# Patient Record
Sex: MALE | Race: WHITE | Employment: FULL TIME | URBAN - METROPOLITAN AREA
[De-identification: names, ages, dates, MRNs, and addresses within clinical notes are randomized per-mention and may not be internally consistent; named-entity substitution may affect disease eponyms.]

---

## 2018-03-26 ENCOUNTER — HOSPITAL ENCOUNTER (OUTPATIENT)
Dept: MAMMOGRAPHY | Age: 53
Discharge: HOME | End: 2018-03-26
Attending: FAMILY MEDICINE
Payer: COMMERCIAL

## 2018-03-26 DIAGNOSIS — N60.02 BREAST CYST, LEFT: ICD-10-CM

## 2018-03-26 PROCEDURE — 76642 ULTRASOUND BREAST LIMITED: CPT

## 2018-03-26 PROCEDURE — 77066 DX MAMMO INCL CAD BI: CPT

## 2020-12-08 ENCOUNTER — HOSPITAL ENCOUNTER (OUTPATIENT)
Dept: CT IMAGING | Age: 55
Discharge: HOME | End: 2020-12-08
Attending: FAMILY MEDICINE

## 2020-12-08 DIAGNOSIS — Z13.220 SCREENING FOR LIPOID DISORDERS: ICD-10-CM

## 2022-07-27 ENCOUNTER — OFFICE VISIT (OUTPATIENT)
Dept: FAMILY MEDICINE CLINIC | Facility: CLINIC | Age: 57
End: 2022-07-27
Payer: COMMERCIAL

## 2022-07-27 VITALS
HEART RATE: 98 BPM | SYSTOLIC BLOOD PRESSURE: 120 MMHG | BODY MASS INDEX: 31.96 KG/M2 | OXYGEN SATURATION: 94 % | DIASTOLIC BLOOD PRESSURE: 76 MMHG | WEIGHT: 210.2 LBS | RESPIRATION RATE: 18 BRPM

## 2022-07-27 DIAGNOSIS — E03.9 HYPOTHYROIDISM, UNSPECIFIED TYPE: ICD-10-CM

## 2022-07-27 DIAGNOSIS — M10.9 GOUT, UNSPECIFIED CAUSE, UNSPECIFIED CHRONICITY, UNSPECIFIED SITE: ICD-10-CM

## 2022-07-27 DIAGNOSIS — E78.5 ELEVATED LIPIDS: ICD-10-CM

## 2022-07-27 DIAGNOSIS — Z00.00 ENCOUNTER FOR GENERAL ADULT MEDICAL EXAMINATION WITHOUT ABNORMAL FINDINGS: ICD-10-CM

## 2022-07-27 DIAGNOSIS — K21.00 GASTRO-ESOPHAGEAL REFLUX DISEASE WITH ESOPHAGITIS, WITHOUT BLEEDING: ICD-10-CM

## 2022-07-27 DIAGNOSIS — F51.01 PRIMARY INSOMNIA: Primary | ICD-10-CM

## 2022-07-27 PROCEDURE — 99396 PREV VISIT EST AGE 40-64: CPT | Performed by: FAMILY MEDICINE

## 2022-07-27 RX ORDER — SILDENAFIL 100 MG/1
TABLET, FILM COATED ORAL
Qty: 12 TABLET | Refills: 5 | Status: SHIPPED | OUTPATIENT
Start: 2022-07-27

## 2022-07-27 RX ORDER — ALLOPURINOL 100 MG/1
100 TABLET ORAL DAILY
Qty: 30 TABLET | Refills: 5 | Status: SHIPPED | OUTPATIENT
Start: 2022-07-27

## 2022-07-27 RX ORDER — LEVOTHYROXINE SODIUM 0.1 MG/1
TABLET ORAL
Qty: 30 TABLET | Refills: 5 | Status: SHIPPED | OUTPATIENT
Start: 2022-07-27

## 2022-07-27 RX ORDER — LORAZEPAM 0.5 MG/1
TABLET ORAL
Qty: 60 TABLET | Refills: 5 | Status: SHIPPED | OUTPATIENT
Start: 2022-07-27 | End: 2023-07-21

## 2022-07-27 RX ORDER — ZOLPIDEM TARTRATE 10 MG/1
TABLET ORAL
Qty: 30 TABLET | Refills: 5 | Status: SHIPPED | OUTPATIENT
Start: 2022-07-27 | End: 2023-07-20

## 2022-07-27 ASSESSMENT — ANXIETY QUESTIONNAIRES
2. NOT BEING ABLE TO STOP OR CONTROL WORRYING: 0
GAD7 TOTAL SCORE: 0
6. BECOMING EASILY ANNOYED OR IRRITABLE: 0
IF YOU CHECKED OFF ANY PROBLEMS ON THIS QUESTIONNAIRE, HOW DIFFICULT HAVE THESE PROBLEMS MADE IT FOR YOU TO DO YOUR WORK, TAKE CARE OF THINGS AT HOME, OR GET ALONG WITH OTHER PEOPLE: NOT DIFFICULT AT ALL
3. WORRYING TOO MUCH ABOUT DIFFERENT THINGS: 0
1. FEELING NERVOUS, ANXIOUS, OR ON EDGE: 0
4. TROUBLE RELAXING: 0
7. FEELING AFRAID AS IF SOMETHING AWFUL MIGHT HAPPEN: 0
5. BEING SO RESTLESS THAT IT IS HARD TO SIT STILL: 0

## 2022-07-27 ASSESSMENT — PATIENT HEALTH QUESTIONNAIRE - PHQ9
2. FEELING DOWN, DEPRESSED OR HOPELESS: 0
SUM OF ALL RESPONSES TO PHQ QUESTIONS 1-9: 0
1. LITTLE INTEREST OR PLEASURE IN DOING THINGS: 0
SUM OF ALL RESPONSES TO PHQ QUESTIONS 1-9: 0
SUM OF ALL RESPONSES TO PHQ9 QUESTIONS 1 & 2: 0

## 2022-07-28 LAB
CHOLEST SERPL-MCNC: 160 MG/DL
HDLC SERPL-MCNC: 40 MG/DL (ref 40–60)
HDLC SERPL: 4 {RATIO}
LDLC SERPL CALC-MCNC: 87 MG/DL
TRIGL SERPL-MCNC: 165 MG/DL (ref 35–150)
TSH, 3RD GENERATION: 0.66 UIU/ML (ref 0.36–3.74)
VLDLC SERPL CALC-MCNC: 33 MG/DL (ref 6–23)

## 2022-08-14 ASSESSMENT — ENCOUNTER SYMPTOMS
GASTROINTESTINAL NEGATIVE: 1
RESPIRATORY NEGATIVE: 1
EYES NEGATIVE: 1

## 2022-08-14 NOTE — PROGRESS NOTES
HISTORY OF PRESENT ILLNESS  Josias Dyson is a 60 y.o. y.o. male    PHYSICAL        Other  This is a new (PHYSICAL) problem. The problem has been gradually improving. Associated symptoms include fatigue. Toe Pain   Incident onset: GOUT. The pain has been Improving since onset. Fatigue  Chronicity: HYPOTHYROID. The current episode started more than 1 year ago. The problem has been gradually improving. Associated symptoms include fatigue. Allergies   Allergen Reactions    Cefdinir Other (See Comments)     Joint pain        Current Outpatient Medications   Medication Sig    LORazepam (ATIVAN) 0.5 MG tablet 1-2 tabs at bedtime1-2 tabs at bedtime    zolpidem (AMBIEN) 10 MG tablet Take 10 mg by mouth. allopurinol (ZYLOPRIM) 100 MG tablet Take 1 tablet by mouth in the morning. levothyroxine (SYNTHROID) 100 MCG tablet TAKE 1 TABLET BY MOUTH DAILY BEFORE BREAKFAST    sildenafil (VIAGRA) 100 MG tablet Take 1/2 to 1 tablet by mouth once daily as needed    cetirizine (ZYRTEC) 10 MG tablet Take by mouth    Cholecalciferol 50 MCG (2000 UT) TABS Take by mouth    omeprazole (PRILOSEC OTC) 20 MG tablet Take 20 mg by mouth daily     No current facility-administered medications for this visit.         Past Medical History:   Diagnosis Date    Anxiety     Esophageal reflux     Gout     Insomnia     Unspecified hypothyroidism         Past Surgical History:   Procedure Laterality Date    COLONOSCOPY  2013    sigmoid polyp        Social History     Socioeconomic History    Marital status:      Spouse name: Not on file    Number of children: Not on file    Years of education: Not on file    Highest education level: Not on file   Occupational History    Not on file   Tobacco Use    Smoking status: Never    Smokeless tobacco: Former     Quit date: 12/16/2015    Tobacco comments:     Quit smoking: Oral tobacco - chewing nicotine-containing substances   Substance and Sexual Activity    Alcohol use: No     Alcohol/week: 0.0 standard drinks    Drug use: No    Sexual activity: Not on file   Other Topics Concern    Not on file   Social History Narrative    Not on file     Social Determinants of Health     Financial Resource Strain: Not on file   Food Insecurity: Not on file   Transportation Needs: Not on file   Physical Activity: Not on file   Stress: Not on file   Social Connections: Not on file   Intimate Partner Violence: Not on file   Housing Stability: Not on file        Review of Systems   Constitutional:  Positive for fatigue. HENT: Negative. Eyes: Negative. Respiratory: Negative. Cardiovascular: Negative. Gastrointestinal: Negative. Endocrine: Negative. Genitourinary: Negative. Skin: Negative. Neurological: Negative. Psychiatric/Behavioral: Negative. /76   Pulse 98   Resp 18   Wt 210 lb 3.2 oz (95.3 kg)   SpO2 94%   BMI 31.96 kg/m²      Physical Exam  Constitutional:       General: He is not in acute distress. Appearance: Normal appearance. HENT:      Head: Normocephalic. Nose: Nose normal.   Eyes:      Conjunctiva/sclera: Conjunctivae normal.   Cardiovascular:      Rate and Rhythm: Normal rate. Pulmonary:      Effort: Pulmonary effort is normal.      Breath sounds: Normal breath sounds. Abdominal:      General: Abdomen is flat. Bowel sounds are normal.      Palpations: Abdomen is soft. Genitourinary:     Penis: Normal.       Testes: Normal.      Prostate: Normal.      Rectum: Normal.   Musculoskeletal:         General: Normal range of motion. Cervical back: Normal range of motion. Skin:     General: Skin is warm and dry. Neurological:      General: No focal deficit present. Mental Status: He is alert.    Psychiatric:         Mood and Affect: Mood normal.       Orders Only on 12/07/2021   Component Date Value Ref Range Status    Glucose 12/07/2021 91  65 - 99 mg/dL Final    BUN 12/07/2021 14  6 - 24 mg/dL Final    Creatinine 12/07/2021 1.08  0.76 - 1.27 22  5 - 40 mg/dL Final    LDL Calculated 12/07/2021 136 (A) 0 - 99 mg/dL Final    LDl/HDL Ratio 12/07/2021 3.5  0.0 - 3.6 ratio Final    Comment:                                     LDL/HDL Ratio                                              Men  Women                                1/2 Avg. Risk  1.0    1.5                                    Avg.Risk  3.6    3.2                                 2X Avg. Risk  6.2    5.0                                 3X Avg. Risk  8.0    6.1      WBC 12/07/2021 7.0  3.4 - 10.8 x10E3/uL Final    RBC 12/07/2021 5.33  4.14 - 5.80 x10E6/uL Final    Hemoglobin 12/07/2021 15.7  13.0 - 17.7 g/dL Final    Hematocrit 12/07/2021 47.6  37.5 - 51.0 % Final    MCV 12/07/2021 89  79 - 97 fL Final    MCH 12/07/2021 29.5  26.6 - 33.0 pg Final    MCHC 12/07/2021 33.0  31.5 - 35.7 g/dL Final    RDW 12/07/2021 12.5  11.6 - 15.4 % Final    Platelets 56/58/0279 242  150 - 450 x10E3/uL Final    Neutrophils % 12/07/2021 59  Not Estab. % Final    Lymphocytes % 12/07/2021 30  Not Estab. % Final    Monocytes % 12/07/2021 9  Not Estab. % Final    Eosinophils % 12/07/2021 1  Not Estab. % Final    Basophils % 12/07/2021 1  Not Estab. % Final    Neutrophils Absolute 12/07/2021 4.1  1.4 - 7.0 x10E3/uL Final    Lymphocytes Absolute 12/07/2021 2.1  0.7 - 3.1 x10E3/uL Final    Monocytes Absolute 12/07/2021 0.7  0.1 - 0.9 x10E3/uL Final    Eosinophils Absolute 12/07/2021 0.1  0.0 - 0.4 x10E3/uL Final    Basophils Absolute 12/07/2021 0.1  0.0 - 0.2 x10E3/uL Final    Immature Granulocytes 12/07/2021 0  Not Estab. % Final    GRANULOCYTE ABSOLUTE COUNT 12/07/2021 0.0  0.0 - 0.1 x10E3/uL Final    Specific Gravity, UA 12/07/2021 1.012  1.005 - 1.030 NA Final    pH, UA 12/07/2021 7.0  5.0 - 7.5 NA Final    Color, UA 12/07/2021 Yellow  Yellow Final    Clarity, UA 12/07/2021 Clear  Clear Final    Leukocyte Esterase, Urine 12/07/2021 Negative  Negative Final    Protein, UA 12/07/2021 Negative  Negative/Trace Final    Glucose, UA 12/07/2021 Negative  Negative Final    Ketones, Urine 12/07/2021 Negative  Negative Final    Blood, Urine 12/07/2021 Negative  Negative Final    Bilirubin, Urine 12/07/2021 Negative  Negative Final    Urobilinogen, Urine 12/07/2021 0.2  0.2 - 1.0 mg/dL Final    Nitrite, Urine 12/07/2021 Negative  Negative Final    Microscopic Examination 12/07/2021 Comment   Final    Microscopic not indicated and not performed. ASSESSMENT and PLAN    Primary insomnia  -     zolpidem (AMBIEN) 10 MG tablet; Take 10 mg by mouth., Disp-30 tablet, R-5Normal  Hypothyroidism, unspecified type  -     TSH; Future  Elevated lipids  -     Lipid Panel; Future  Gout, unspecified cause, unspecified chronicity, unspecified site  Encounter for general adult medical examination without abnormal findings  Gastro-esophageal reflux disease with esophagitis, without bleeding      Current treatment plan is effective. no changes in therapy  lab results and schedule for future lab studies reviewed with patient  reviewed diet, exercise and weight control   The patient was seen today for the annual preventive health visit. The patient was provided anticipatory guidance and counseling regarding age / sex appropriate health maintenance issues including vaccinations, blood pressure screening, lipid screening, cancer screenings, diet, exercise, avoidance of tobacco / substance abuse. No follow-ups on file.      Jonah Sharpe MD

## 2022-12-15 ENCOUNTER — NURSE ONLY (OUTPATIENT)
Dept: FAMILY MEDICINE CLINIC | Facility: CLINIC | Age: 57
End: 2022-12-15

## 2022-12-15 DIAGNOSIS — E03.9 HYPOTHYROIDISM, UNSPECIFIED TYPE: ICD-10-CM

## 2022-12-15 DIAGNOSIS — Z00.00 ENCOUNTER FOR ANNUAL PHYSICAL EXAM: ICD-10-CM

## 2022-12-15 DIAGNOSIS — Z12.5 SCREENING PSA (PROSTATE SPECIFIC ANTIGEN): ICD-10-CM

## 2022-12-15 DIAGNOSIS — Z00.00 ENCOUNTER FOR ANNUAL PHYSICAL EXAM: Primary | ICD-10-CM

## 2022-12-15 LAB
APPEARANCE UR: CLEAR
BACTERIA URNS QL MICRO: ABNORMAL /HPF
BASOPHILS # BLD: 0.1 K/UL (ref 0–0.2)
BASOPHILS NFR BLD: 1 % (ref 0–2)
BILIRUB UR QL: NEGATIVE
CASTS URNS QL MICRO: 0 /LPF
COLOR UR: ABNORMAL
CRYSTALS URNS QL MICRO: 0 /LPF
DIFFERENTIAL METHOD BLD: ABNORMAL
EOSINOPHIL # BLD: 0.1 K/UL (ref 0–0.8)
EOSINOPHIL NFR BLD: 1 % (ref 0.5–7.8)
EPI CELLS #/AREA URNS HPF: ABNORMAL /HPF
ERYTHROCYTE [DISTWIDTH] IN BLOOD BY AUTOMATED COUNT: 12.4 % (ref 11.9–14.6)
GLUCOSE UR STRIP.AUTO-MCNC: NEGATIVE MG/DL
HCT VFR BLD AUTO: 50.5 % (ref 41.1–50.3)
HGB BLD-MCNC: 16.6 G/DL (ref 13.6–17.2)
HGB UR QL STRIP: NEGATIVE
IMM GRANULOCYTES # BLD AUTO: 0 K/UL (ref 0–0.5)
IMM GRANULOCYTES NFR BLD AUTO: 0 % (ref 0–5)
KETONES UR QL STRIP.AUTO: NEGATIVE MG/DL
LEUKOCYTE ESTERASE UR QL STRIP.AUTO: NEGATIVE
LYMPHOCYTES # BLD: 2.1 K/UL (ref 0.5–4.6)
LYMPHOCYTES NFR BLD: 26 % (ref 13–44)
MCH RBC QN AUTO: 30 PG (ref 26.1–32.9)
MCHC RBC AUTO-ENTMCNC: 32.9 G/DL (ref 31.4–35)
MCV RBC AUTO: 91.3 FL (ref 82–102)
MONOCYTES # BLD: 0.7 K/UL (ref 0.1–1.3)
MONOCYTES NFR BLD: 9 % (ref 4–12)
MUCOUS THREADS URNS QL MICRO: 0 /LPF
NEUTS SEG # BLD: 5.1 K/UL (ref 1.7–8.2)
NEUTS SEG NFR BLD: 63 % (ref 43–78)
NITRITE UR QL STRIP.AUTO: NEGATIVE
NRBC # BLD: 0 K/UL (ref 0–0.2)
PH UR STRIP: 7 [PH] (ref 5–9)
PLATELET # BLD AUTO: 225 K/UL (ref 150–450)
PMV BLD AUTO: 10.1 FL (ref 9.4–12.3)
PROT UR STRIP-MCNC: NEGATIVE MG/DL
PSA SERPL-MCNC: 1.5 NG/ML
RBC # BLD AUTO: 5.53 M/UL (ref 4.23–5.6)
RBC #/AREA URNS HPF: ABNORMAL /HPF
SP GR UR REFRACTOMETRY: <1.005 (ref 1–1.02)
URINE CULTURE IF INDICATED: ABNORMAL
UROBILINOGEN UR QL STRIP.AUTO: 0.2 EU/DL (ref 0.2–1)
WBC # BLD AUTO: 8.2 K/UL (ref 4.3–11.1)
WBC URNS QL MICRO: ABNORMAL /HPF

## 2022-12-16 ENCOUNTER — TELEPHONE (OUTPATIENT)
Dept: FAMILY MEDICINE CLINIC | Facility: CLINIC | Age: 57
End: 2022-12-16

## 2022-12-16 LAB
ALBUMIN SERPL-MCNC: 4.2 G/DL (ref 3.5–5)
ALBUMIN/GLOB SERPL: 1.4 {RATIO} (ref 0.4–1.6)
ALP SERPL-CCNC: 67 U/L (ref 50–136)
ALT SERPL-CCNC: 24 U/L (ref 12–65)
ANION GAP SERPL CALC-SCNC: 11 MMOL/L (ref 2–11)
AST SERPL-CCNC: 16 U/L (ref 15–37)
BILIRUB SERPL-MCNC: 0.6 MG/DL (ref 0.2–1.1)
BUN SERPL-MCNC: 13 MG/DL (ref 6–23)
CALCIUM SERPL-MCNC: 9.5 MG/DL (ref 8.3–10.4)
CHLORIDE SERPL-SCNC: 104 MMOL/L (ref 101–110)
CHOLEST SERPL-MCNC: 214 MG/DL
CO2 SERPL-SCNC: 24 MMOL/L (ref 21–32)
CREAT SERPL-MCNC: 1.2 MG/DL (ref 0.8–1.5)
GLOBULIN SER CALC-MCNC: 3 G/DL (ref 2.8–4.5)
GLUCOSE SERPL-MCNC: 94 MG/DL (ref 65–100)
HDLC SERPL-MCNC: 44 MG/DL (ref 40–60)
HDLC SERPL: 4.9 {RATIO}
LDLC SERPL CALC-MCNC: 147.8 MG/DL
POTASSIUM SERPL-SCNC: 4.7 MMOL/L (ref 3.5–5.1)
PROT SERPL-MCNC: 7.2 G/DL (ref 6.3–8.2)
SODIUM SERPL-SCNC: 139 MMOL/L (ref 133–143)
TRIGL SERPL-MCNC: 111 MG/DL (ref 35–150)
TSH, 3RD GENERATION: 0.88 UIU/ML (ref 0.36–3.74)
VLDLC SERPL CALC-MCNC: 22.2 MG/DL (ref 6–23)

## 2022-12-22 DIAGNOSIS — F51.01 PRIMARY INSOMNIA: ICD-10-CM

## 2022-12-23 RX ORDER — ZOLPIDEM TARTRATE 10 MG/1
TABLET ORAL
Qty: 30 TABLET | Refills: 5 | Status: SHIPPED | OUTPATIENT
Start: 2022-12-23 | End: 2023-12-15

## 2022-12-23 RX ORDER — LEVOTHYROXINE SODIUM 0.1 MG/1
TABLET ORAL
Qty: 30 TABLET | Refills: 5 | Status: SHIPPED | OUTPATIENT
Start: 2022-12-23

## 2022-12-23 RX ORDER — ALLOPURINOL 100 MG/1
100 TABLET ORAL DAILY
Qty: 30 TABLET | Refills: 5 | Status: SHIPPED | OUTPATIENT
Start: 2022-12-23

## 2022-12-23 RX ORDER — LORAZEPAM 0.5 MG/1
TABLET ORAL
Qty: 60 TABLET | Refills: 5 | Status: SHIPPED | OUTPATIENT
Start: 2022-12-23 | End: 2023-12-16

## 2023-03-06 ENCOUNTER — OFFICE VISIT (OUTPATIENT)
Dept: FAMILY MEDICINE CLINIC | Facility: CLINIC | Age: 58
End: 2023-03-06

## 2023-03-06 VITALS
HEART RATE: 70 BPM | BODY MASS INDEX: 32.13 KG/M2 | HEIGHT: 68 IN | SYSTOLIC BLOOD PRESSURE: 122 MMHG | OXYGEN SATURATION: 95 % | WEIGHT: 212 LBS | DIASTOLIC BLOOD PRESSURE: 76 MMHG | TEMPERATURE: 97.2 F

## 2023-03-06 DIAGNOSIS — M10.9 GOUT, UNSPECIFIED CAUSE, UNSPECIFIED CHRONICITY, UNSPECIFIED SITE: ICD-10-CM

## 2023-03-06 DIAGNOSIS — F51.01 PRIMARY INSOMNIA: ICD-10-CM

## 2023-03-06 DIAGNOSIS — Z00.00 ENCOUNTER FOR GENERAL ADULT MEDICAL EXAMINATION WITHOUT ABNORMAL FINDINGS: Primary | ICD-10-CM

## 2023-03-06 DIAGNOSIS — K21.00 GASTRO-ESOPHAGEAL REFLUX DISEASE WITH ESOPHAGITIS, WITHOUT BLEEDING: ICD-10-CM

## 2023-03-06 DIAGNOSIS — E03.4 ATROPHY OF THYROID (ACQUIRED): ICD-10-CM

## 2023-03-06 RX ORDER — LORAZEPAM 0.5 MG/1
TABLET ORAL
Qty: 60 TABLET | Refills: 5 | Status: SHIPPED | OUTPATIENT
Start: 2023-03-06 | End: 2024-02-27

## 2023-03-06 RX ORDER — ZOLPIDEM TARTRATE 10 MG/1
TABLET ORAL
Qty: 30 TABLET | Refills: 5 | Status: SHIPPED | OUTPATIENT
Start: 2023-03-06 | End: 2024-02-26

## 2023-03-06 SDOH — ECONOMIC STABILITY: FOOD INSECURITY: WITHIN THE PAST 12 MONTHS, THE FOOD YOU BOUGHT JUST DIDN'T LAST AND YOU DIDN'T HAVE MONEY TO GET MORE.: NEVER TRUE

## 2023-03-06 SDOH — ECONOMIC STABILITY: HOUSING INSECURITY
IN THE LAST 12 MONTHS, WAS THERE A TIME WHEN YOU DID NOT HAVE A STEADY PLACE TO SLEEP OR SLEPT IN A SHELTER (INCLUDING NOW)?: NO

## 2023-03-06 SDOH — ECONOMIC STABILITY: INCOME INSECURITY: HOW HARD IS IT FOR YOU TO PAY FOR THE VERY BASICS LIKE FOOD, HOUSING, MEDICAL CARE, AND HEATING?: NOT HARD AT ALL

## 2023-03-06 SDOH — ECONOMIC STABILITY: FOOD INSECURITY: WITHIN THE PAST 12 MONTHS, YOU WORRIED THAT YOUR FOOD WOULD RUN OUT BEFORE YOU GOT MONEY TO BUY MORE.: NEVER TRUE

## 2023-03-06 ASSESSMENT — PATIENT HEALTH QUESTIONNAIRE - PHQ9
SUM OF ALL RESPONSES TO PHQ9 QUESTIONS 1 & 2: 0
SUM OF ALL RESPONSES TO PHQ QUESTIONS 1-9: 0
2. FEELING DOWN, DEPRESSED OR HOPELESS: 0
SUM OF ALL RESPONSES TO PHQ QUESTIONS 1-9: 0
1. LITTLE INTEREST OR PLEASURE IN DOING THINGS: 0

## 2023-03-12 ASSESSMENT — ENCOUNTER SYMPTOMS
RESPIRATORY NEGATIVE: 1
EYES NEGATIVE: 1
GASTROINTESTINAL NEGATIVE: 1

## 2023-03-12 NOTE — PROGRESS NOTES
HISTORY OF PRESENT ILLNESS  Lisa Lopes is a 59 y.o. y.o. male    Other  This is a new (physical) problem. The problem has been unchanged. Thyroid Problem  Presents for follow-up visit. The symptoms have been stable. Insomnia  This is a recurrent problem. The problem has been gradually improving. Gout  This is a recurrent problem. The problem has been gradually improving. Allergies   Allergen Reactions    Cefdinir Other (See Comments)     Joint pain        Current Outpatient Medications   Medication Sig    zolpidem (AMBIEN) 10 MG tablet Take 10 mg by mouth. LORazepam (ATIVAN) 0.5 MG tablet 1-2 tabs at bedtime1-2 tabs at bedtime    zolpidem (AMBIEN) 10 MG tablet Take 10 mg by mouth. allopurinol (ZYLOPRIM) 100 MG tablet Take 1 tablet by mouth daily    levothyroxine (SYNTHROID) 100 MCG tablet TAKE 1 TABLET BY MOUTH DAILY BEFORE BREAKFAST    LORazepam (ATIVAN) 0.5 MG tablet 1-2 tabs at bedtime1-2 tabs at bedtime    allopurinol (ZYLOPRIM) 100 MG tablet Take 1 tablet by mouth daily    levothyroxine (SYNTHROID) 100 MCG tablet TAKE 1 TABLET BY MOUTH DAILY BEFORE BREAKFAST    sildenafil (VIAGRA) 100 MG tablet Take 1/2 to 1 tablet by mouth once daily as needed    cetirizine (ZYRTEC) 10 MG tablet Take by mouth    Cholecalciferol 50 MCG (2000 UT) TABS Take by mouth    omeprazole (PRILOSEC OTC) 20 MG tablet Take 20 mg by mouth daily     No current facility-administered medications for this visit.         Past Medical History:   Diagnosis Date    Anxiety     Esophageal reflux     Gout     Insomnia     Unspecified hypothyroidism         Past Surgical History:   Procedure Laterality Date    COLONOSCOPY  2013    sigmoid polyp        Social History     Socioeconomic History    Marital status:      Spouse name: Not on file    Number of children: Not on file    Years of education: Not on file    Highest education level: Not on file   Occupational History    Not on file   Tobacco Use    Smoking status: Never Smokeless tobacco: Former     Quit date: 12/16/2015    Tobacco comments:     Quit smoking: Oral tobacco - chewing nicotine-containing substances   Substance and Sexual Activity    Alcohol use: No     Alcohol/week: 0.0 standard drinks    Drug use: No    Sexual activity: Not on file   Other Topics Concern    Not on file   Social History Narrative    Not on file     Social Determinants of Health     Financial Resource Strain: Low Risk     Difficulty of Paying Living Expenses: Not hard at all   Food Insecurity: No Food Insecurity    Worried About [employer] in the Last Year: Never true    [address] [facility] [employer] in the Last Year: Never true   Transportation Needs: Unknown    Lack of Transportation (Medical): Not on file    Lack of Transportation (Non-Medical): No   Physical Activity: Not on file   Stress: Not on file   Social Connections: Not on file   Intimate Partner Violence: Not on file   Housing Stability: Unknown    Unable to Pay for Housing in the Last Year: Not on file    Number of Places Lived in the Last Year: Not on file    Unstable Housing in the Last Year: No        Review of Systems   Constitutional: Negative. HENT: Negative. Eyes: Negative. Respiratory: Negative. Cardiovascular: Negative. Gastrointestinal: Negative. Endocrine: Negative. Genitourinary: Negative. Musculoskeletal:  Positive for gout. Skin: Negative. Neurological: Negative. Psychiatric/Behavioral:  The patient has insomnia. /76   Pulse 70   Temp 97.2 °F (36.2 °C) (Temporal)   Ht 5' 8\" (1.727 m)   Wt 212 lb (96.2 kg)   SpO2 95%   BMI 32.23 kg/m²      Physical Exam  Constitutional:       General: He is not in acute distress. Appearance: Normal appearance. HENT:      Head: Normocephalic. Nose: Nose normal.   Eyes:      Conjunctiva/sclera: Conjunctivae normal.   Cardiovascular:      Rate and Rhythm: Normal rate.    Pulmonary:      Effort: Pulmonary effort is normal.      Breath sounds: Normal breath sounds. Abdominal:      General: Abdomen is flat. Bowel sounds are normal.      Palpations: Abdomen is soft. Musculoskeletal:         General: Normal range of motion. Cervical back: Normal range of motion. Skin:     General: Skin is warm and dry. Neurological:      General: No focal deficit present. Mental Status: He is alert. Psychiatric:         Mood and Affect: Mood normal.       Nurse Only on 12/15/2022   Component Date Value Ref Range Status    TSH, 3RD GENERATION 12/15/2022 0.876  0.358 - 3.740 uIU/mL Final    PSA 12/15/2022 1.5  <4.0 ng/mL Final    Comment: [employer]. New method in use, please reestablish patient baseline  [employer] [place] LOCI technology. Patient's results of tumor marker testing may not be comparable to labs using different manufacturers/methods.       Color, UA 12/15/2022 YELLOW/STRAW    Final    Color Reference Range: Straw, Yellow or Dark Yellow    Appearance 12/15/2022 CLEAR    Final    Specific Gravity, UA 12/15/2022 <1.005  1.001 - 1.023 Final    pH, Urine 12/15/2022 7.0  5.0 - 9.0   Final    Protein, UA 12/15/2022 Negative  Negative mg/dL Final    Glucose, UA 12/15/2022 Negative  mg/dL Final    Ketones, Urine 12/15/2022 Negative  Negative mg/dL Final    Bilirubin Urine 12/15/2022 Negative  Negative   Final    Blood, Urine 12/15/2022 Negative  Negative   Final    Urobilinogen, Urine 12/15/2022 0.2  0.2 - 1.0 EU/dL Final    Nitrite, Urine 12/15/2022 Negative  Negative   Final    Leukocyte Esterase, Urine 12/15/2022 Negative  Negative   Final    WBC, UA 12/15/2022 3-5  0 /hpf Final    RBC, UA 12/15/2022 3-5  0 /hpf Final    BACTERIA, URINE 12/15/2022 1+ (A)  0 /hpf Final    Urine Culture if Indicated 12/15/2022 CULTURE NOT INDICATED BY UA RESULT    Final    Epithelial Cells UA 12/15/2022 5-10  0 /hpf Final    Casts 12/15/2022 0  0 /lpf Final    Crystals 12/15/2022 0  0 /LPF Final    Mucus, UA 12/15/2022 0  0 /lpf Final    Cholesterol, Total 12/15/2022 214 (A)  <200 MG/DL Final    Comment: Borderline High: 200-239 mg/dL  High: Greater than or equal to 240 mg/dL      Triglycerides 12/15/2022 111  35 - 150 MG/DL Final    Comment: Borderline High: 150-199 mg/dL, High: 200-499 mg/dL  Very High: Greater than or equal to 500 mg/dL      HDL 12/15/2022 44  40 - 60 MG/DL Final    LDL Calculated 12/15/2022 147.8 (A)  <100 MG/DL Final    Comment: Near Optimal: 100-129 mg/dL  Borderline High: 130-159, High: 160-189 mg/dL  Very High: Greater than or equal to 190 mg/dL      VLDL Cholesterol Calculated 12/15/2022 22.2  6.0 - 23.0 MG/DL Final    Chol/HDL Ratio 12/15/2022 4.9    Final    Sodium 12/15/2022 139  133 - 143 mmol/L Final    Potassium 12/15/2022 4.7  3.5 - 5.1 mmol/L Final    Chloride 12/15/2022 104  101 - 110 mmol/L Final    CO2 12/15/2022 24  21 - 32 mmol/L Final    Anion Gap 12/15/2022 11  2 - 11 mmol/L Final    Glucose 12/15/2022 94  65 - 100 mg/dL Final    BUN 12/15/2022 13  6 - 23 MG/DL Final    Creatinine 12/15/2022 1.20  0.8 - 1.5 MG/DL Final    Est, Glom Filt Rate 12/15/2022 >60  >60 ml/min/1.73m2 Final    Comment:   Pediatric calculator link: [email or web address] org/professionals/kdoqi/gfr_calculatorped    These results are not intended for use in patients <25years of age. eGFR results are calculated without a race factor using  the 2021 CKD-EPI equation. Careful clinical correlation is recommended, particularly when comparing to results calculated using previous equations. The CKD-EPI equation is less accurate in patients with extremes of muscle mass, extra-renal metabolism of creatinine, excessive creatine ingestion, or following therapy that affects renal tubular secretion.       Calcium 12/15/2022 9.5  8.3 - 10.4 MG/DL Final    Total Bilirubin 12/15/2022 0.6  0.2 - 1.1 MG/DL Final    ALT 12/15/2022 24  12 - 65 U/L Final    AST 12/15/2022 16  15 - 37 U/L Final    Alk Phosphatase 12/15/2022 67  50 - 136 U/L Final    Total Protein 12/15/2022 7.2 6.3 - 8.2 g/dL Final    Albumin 12/15/2022 4.2  3.5 - 5.0 g/dL Final    Globulin 12/15/2022 3.0  2.8 - 4.5 g/dL Final    Albumin/Globulin Ratio 12/15/2022 1.4  0.4 - 1.6   Final    WBC 12/15/2022 8.2  4.3 - 11.1 K/uL Final    RBC 12/15/2022 5.53  4.23 - 5.6 M/uL Final    Hemoglobin 12/15/2022 16.6  13.6 - 17.2 g/dL Final    Hematocrit 12/15/2022 50.5 (A)  41.1 - 50.3 % Final    MCV 12/15/2022 91.3  82 - 102 FL Final    MCH 12/15/2022 30.0  26.1 - 32.9 PG Final    MCHC 12/15/2022 32.9  31.4 - 35.0 g/dL Final    RDW 12/15/2022 12.4  11.9 - 14.6 % Final    Platelets 15/87/8525 225  150 - 450 K/uL Final    MPV 12/15/2022 10.1  9.4 - 12.3 FL Final    nRBC 12/15/2022 0.00  0.0 - 0.2 K/uL Final    **Note: Absolute NRBC parameter is now reported with Hemogram**    Differential Type 12/15/2022 AUTOMATED    Final    Seg Neutrophils 12/15/2022 63  43 - 78 % Final    Lymphocytes 12/15/2022 26  13 - 44 % Final    Monocytes 12/15/2022 9  4.0 - 12.0 % Final    Eosinophils % 12/15/2022 1  0.5 - 7.8 % Final    Basophils 12/15/2022 1  0.0 - 2.0 % Final    Immature Granulocytes 12/15/2022 0  0.0 - 5.0 % Final    Segs Absolute 12/15/2022 5.1  1.7 - 8.2 K/UL Final    Absolute Lymph # 12/15/2022 2.1  0.5 - 4.6 K/UL Final    Absolute Mono # 12/15/2022 0.7  0.1 - 1.3 K/UL Final    Absolute Eos # 12/15/2022 0.1  0.0 - 0.8 K/UL Final    Basophils Absolute 12/15/2022 0.1  0.0 - 0.2 K/UL Final    Absolute Immature Granulocyte 12/15/2022 0.0  0.0 - 0.5 K/UL Final       ASSESSMENT and PLAN    Encounter for general adult medical examination without abnormal findings  Primary insomnia  -     zolpidem (AMBIEN) 10 MG tablet;  Take 10 mg by mouth., Disp-30 tablet, R-5Normal  -     LORazepam (ATIVAN) 0.5 MG tablet; 1-2 tabs at bedtime1-2 tabs at bedtime, Disp-60 tablet, R-5Normal  Gastro-esophageal reflux disease with esophagitis, without bleeding  Gout, unspecified cause, unspecified chronicity, unspecified site  Atrophy of thyroid (acquired)    The patient was seen today for the annual preventive health visit. The patient was provided anticipatory guidance and counseling regarding age / sex appropriate health maintenance issues including vaccinations, blood pressure screening, lipid screening, cancer screenings, diet, exercise, avoidance of tobacco / substance abuse. Current treatment plan is effective. no changes in therapy  lab results and schedule for future lab studies reviewed with patient  reviewed diet, exercise and weight control   Cardiovascular risk and specific lipid/ LDL goals reviewed    No follow-ups on file.      Gem Higuera MD

## 2023-06-26 RX ORDER — ALLOPURINOL 100 MG/1
100 TABLET ORAL DAILY
Qty: 30 TABLET | Refills: 5 | OUTPATIENT
Start: 2023-06-26

## 2023-06-26 RX ORDER — ALLOPURINOL 100 MG/1
100 TABLET ORAL DAILY
Qty: 90 TABLET | Refills: 3 | Status: SHIPPED | OUTPATIENT
Start: 2023-06-26

## 2023-06-26 RX ORDER — LEVOTHYROXINE SODIUM 0.1 MG/1
TABLET ORAL
Qty: 90 TABLET | Refills: 1 | Status: SHIPPED | OUTPATIENT
Start: 2023-06-26

## 2023-08-29 DIAGNOSIS — E03.9 HYPOTHYROIDISM, UNSPECIFIED TYPE: Primary | ICD-10-CM

## 2023-08-29 DIAGNOSIS — E78.5 ELEVATED LIPIDS: ICD-10-CM

## 2023-08-30 ENCOUNTER — NURSE ONLY (OUTPATIENT)
Dept: FAMILY MEDICINE CLINIC | Facility: CLINIC | Age: 58
End: 2023-08-30

## 2023-08-30 DIAGNOSIS — E78.5 ELEVATED LIPIDS: ICD-10-CM

## 2023-08-30 DIAGNOSIS — E03.9 HYPOTHYROIDISM, UNSPECIFIED TYPE: ICD-10-CM

## 2023-08-30 LAB
CHOLEST SERPL-MCNC: 185 MG/DL
HDLC SERPL-MCNC: 44 MG/DL (ref 40–60)
HDLC SERPL: 4.2
LDLC SERPL CALC-MCNC: 119.8 MG/DL
TRIGL SERPL-MCNC: 106 MG/DL (ref 35–150)
TSH, 3RD GENERATION: 0.44 UIU/ML (ref 0.36–3.74)
VLDLC SERPL CALC-MCNC: 21.2 MG/DL (ref 6–23)

## 2023-09-07 ENCOUNTER — OFFICE VISIT (OUTPATIENT)
Dept: FAMILY MEDICINE CLINIC | Facility: CLINIC | Age: 58
End: 2023-09-07
Payer: COMMERCIAL

## 2023-09-07 VITALS
OXYGEN SATURATION: 98 % | TEMPERATURE: 98.4 F | HEART RATE: 61 BPM | SYSTOLIC BLOOD PRESSURE: 118 MMHG | WEIGHT: 214 LBS | HEIGHT: 68 IN | DIASTOLIC BLOOD PRESSURE: 72 MMHG | BODY MASS INDEX: 32.43 KG/M2

## 2023-09-07 DIAGNOSIS — K21.9 GASTROESOPHAGEAL REFLUX DISEASE WITHOUT ESOPHAGITIS: ICD-10-CM

## 2023-09-07 DIAGNOSIS — M10.9 GOUT, UNSPECIFIED CAUSE, UNSPECIFIED CHRONICITY, UNSPECIFIED SITE: ICD-10-CM

## 2023-09-07 DIAGNOSIS — F51.01 PRIMARY INSOMNIA: ICD-10-CM

## 2023-09-07 DIAGNOSIS — E03.9 HYPOTHYROIDISM, UNSPECIFIED TYPE: Primary | ICD-10-CM

## 2023-09-07 PROCEDURE — 99214 OFFICE O/P EST MOD 30 MIN: CPT | Performed by: FAMILY MEDICINE

## 2023-09-07 ASSESSMENT — PATIENT HEALTH QUESTIONNAIRE - PHQ9
1. LITTLE INTEREST OR PLEASURE IN DOING THINGS: NOT AT ALL
SUM OF ALL RESPONSES TO PHQ QUESTIONS 1-9: 0
2. FEELING DOWN, DEPRESSED OR HOPELESS: 0
2. FEELING DOWN, DEPRESSED OR HOPELESS: NOT AT ALL
SUM OF ALL RESPONSES TO PHQ9 QUESTIONS 1 & 2: 0
SUM OF ALL RESPONSES TO PHQ QUESTIONS 1-9: 0
1. LITTLE INTEREST OR PLEASURE IN DOING THINGS: 0
SUM OF ALL RESPONSES TO PHQ9 QUESTIONS 1 & 2: 0

## 2023-09-15 RX ORDER — LORAZEPAM 0.5 MG/1
TABLET ORAL
Qty: 60 TABLET | Refills: 5 | Status: SHIPPED | OUTPATIENT
Start: 2023-09-15 | End: 2024-08-30

## 2023-09-15 RX ORDER — ZOLPIDEM TARTRATE 10 MG/1
TABLET ORAL
Qty: 30 TABLET | Refills: 5 | Status: SHIPPED | OUTPATIENT
Start: 2023-09-15 | End: 2024-08-29

## 2023-09-15 ASSESSMENT — ENCOUNTER SYMPTOMS
RESPIRATORY NEGATIVE: 1
HEARTBURN: 1
EYES NEGATIVE: 1

## 2024-01-16 RX ORDER — LEVOTHYROXINE SODIUM 0.1 MG/1
TABLET ORAL
Qty: 90 TABLET | Refills: 1 | Status: SHIPPED | OUTPATIENT
Start: 2024-01-16

## 2024-02-27 ENCOUNTER — NURSE ONLY (OUTPATIENT)
Dept: FAMILY MEDICINE CLINIC | Facility: CLINIC | Age: 59
End: 2024-02-27

## 2024-02-27 DIAGNOSIS — E78.5 ELEVATED LIPIDS: ICD-10-CM

## 2024-02-27 DIAGNOSIS — E03.4 ATROPHY OF THYROID (ACQUIRED): ICD-10-CM

## 2024-02-27 DIAGNOSIS — Z12.5 SCREENING PSA (PROSTATE SPECIFIC ANTIGEN): ICD-10-CM

## 2024-02-27 DIAGNOSIS — Z00.00 ANNUAL PHYSICAL EXAM: Primary | ICD-10-CM

## 2024-02-27 DIAGNOSIS — Z00.00 ANNUAL PHYSICAL EXAM: ICD-10-CM

## 2024-02-27 LAB
ALBUMIN SERPL-MCNC: 4.3 G/DL (ref 3.2–4.6)
ALBUMIN/GLOB SERPL: 1.5 (ref 0.4–1.6)
ALP SERPL-CCNC: 64 U/L (ref 50–136)
ALT SERPL-CCNC: 21 U/L (ref 12–65)
ANION GAP SERPL CALC-SCNC: 5 MMOL/L (ref 2–11)
APPEARANCE UR: CLEAR
AST SERPL-CCNC: 16 U/L (ref 15–37)
BACTERIA URNS QL MICRO: NEGATIVE /HPF
BASOPHILS # BLD: 0.1 K/UL (ref 0–0.2)
BASOPHILS NFR BLD: 1 % (ref 0–2)
BILIRUB SERPL-MCNC: 0.6 MG/DL (ref 0.2–1.1)
BILIRUB UR QL: NEGATIVE
BUN SERPL-MCNC: 18 MG/DL (ref 8–23)
CALCIUM SERPL-MCNC: 9.6 MG/DL (ref 8.3–10.4)
CASTS URNS QL MICRO: ABNORMAL /LPF (ref 0–2)
CHLORIDE SERPL-SCNC: 104 MMOL/L (ref 103–113)
CHOLEST SERPL-MCNC: 210 MG/DL
CO2 SERPL-SCNC: 29 MMOL/L (ref 21–32)
COLOR UR: ABNORMAL
CREAT SERPL-MCNC: 1.2 MG/DL (ref 0.8–1.5)
DIFFERENTIAL METHOD BLD: NORMAL
EOSINOPHIL # BLD: 0.1 K/UL (ref 0–0.8)
EOSINOPHIL NFR BLD: 1 % (ref 0.5–7.8)
EPI CELLS #/AREA URNS HPF: ABNORMAL /HPF (ref 0–5)
ERYTHROCYTE [DISTWIDTH] IN BLOOD BY AUTOMATED COUNT: 13.2 % (ref 11.9–14.6)
GLOBULIN SER CALC-MCNC: 2.9 G/DL (ref 2.8–4.5)
GLUCOSE SERPL-MCNC: 90 MG/DL (ref 65–100)
GLUCOSE UR STRIP.AUTO-MCNC: NEGATIVE MG/DL
HCT VFR BLD AUTO: 49.5 % (ref 41.1–50.3)
HDLC SERPL-MCNC: 46 MG/DL (ref 40–60)
HDLC SERPL: 4.6
HGB BLD-MCNC: 15.8 G/DL (ref 13.6–17.2)
HGB UR QL STRIP: NEGATIVE
IMM GRANULOCYTES # BLD AUTO: 0 K/UL (ref 0–0.5)
IMM GRANULOCYTES NFR BLD AUTO: 0 % (ref 0–5)
KETONES UR QL STRIP.AUTO: 15 MG/DL
LDLC SERPL CALC-MCNC: 147.6 MG/DL
LEUKOCYTE ESTERASE UR QL STRIP.AUTO: NEGATIVE
LYMPHOCYTES # BLD: 1.6 K/UL (ref 0.5–4.6)
LYMPHOCYTES NFR BLD: 26 % (ref 13–44)
MCH RBC QN AUTO: 29.3 PG (ref 26.1–32.9)
MCHC RBC AUTO-ENTMCNC: 31.9 G/DL (ref 31.4–35)
MCV RBC AUTO: 91.8 FL (ref 82–102)
MONOCYTES # BLD: 0.6 K/UL (ref 0.1–1.3)
MONOCYTES NFR BLD: 9 % (ref 4–12)
MUCOUS THREADS URNS QL MICRO: 0 /LPF
NEUTS SEG # BLD: 3.9 K/UL (ref 1.7–8.2)
NEUTS SEG NFR BLD: 63 % (ref 43–78)
NITRITE UR QL STRIP.AUTO: NEGATIVE
NRBC # BLD: 0 K/UL (ref 0–0.2)
PH UR STRIP: 5.5 (ref 5–9)
PLATELET # BLD AUTO: 225 K/UL (ref 150–450)
PMV BLD AUTO: 10.2 FL (ref 9.4–12.3)
POTASSIUM SERPL-SCNC: 4.4 MMOL/L (ref 3.5–5.1)
PROT SERPL-MCNC: 7.2 G/DL (ref 6.3–8.2)
PROT UR STRIP-MCNC: NEGATIVE MG/DL
PSA SERPL-MCNC: 1.3 NG/ML
RBC # BLD AUTO: 5.39 M/UL (ref 4.23–5.6)
RBC #/AREA URNS HPF: ABNORMAL /HPF (ref 0–5)
SODIUM SERPL-SCNC: 138 MMOL/L (ref 136–146)
SP GR UR REFRACTOMETRY: 1.01 (ref 1–1.02)
TRIGL SERPL-MCNC: 82 MG/DL (ref 35–150)
TSH, 3RD GENERATION: 1.5 UIU/ML (ref 0.36–3.74)
URINE CULTURE IF INDICATED: ABNORMAL
UROBILINOGEN UR QL STRIP.AUTO: 0.2 EU/DL (ref 0.2–1)
VLDLC SERPL CALC-MCNC: 16.4 MG/DL (ref 6–23)
WBC # BLD AUTO: 6.3 K/UL (ref 4.3–11.1)
WBC URNS QL MICRO: ABNORMAL /HPF (ref 0–4)

## 2024-03-05 ASSESSMENT — PATIENT HEALTH QUESTIONNAIRE - PHQ9
SUM OF ALL RESPONSES TO PHQ QUESTIONS 1-9: 0
2. FEELING DOWN, DEPRESSED OR HOPELESS: NOT AT ALL
SUM OF ALL RESPONSES TO PHQ QUESTIONS 1-9: 0
SUM OF ALL RESPONSES TO PHQ QUESTIONS 1-9: 0
1. LITTLE INTEREST OR PLEASURE IN DOING THINGS: NOT AT ALL
2. FEELING DOWN, DEPRESSED OR HOPELESS: NOT AT ALL
SUM OF ALL RESPONSES TO PHQ9 QUESTIONS 1 & 2: 0
SUM OF ALL RESPONSES TO PHQ QUESTIONS 1-9: 0
SUM OF ALL RESPONSES TO PHQ9 QUESTIONS 1 & 2: 0
1. LITTLE INTEREST OR PLEASURE IN DOING THINGS: NOT AT ALL

## 2024-03-07 ENCOUNTER — OFFICE VISIT (OUTPATIENT)
Dept: FAMILY MEDICINE CLINIC | Facility: CLINIC | Age: 59
End: 2024-03-07
Payer: COMMERCIAL

## 2024-03-07 VITALS — BODY MASS INDEX: 29.65 KG/M2 | TEMPERATURE: 97.2 F | WEIGHT: 195 LBS | OXYGEN SATURATION: 99 % | HEART RATE: 88 BPM

## 2024-03-07 DIAGNOSIS — F51.01 PRIMARY INSOMNIA: ICD-10-CM

## 2024-03-07 DIAGNOSIS — M10.9 GOUT, UNSPECIFIED CAUSE, UNSPECIFIED CHRONICITY, UNSPECIFIED SITE: ICD-10-CM

## 2024-03-07 DIAGNOSIS — Z00.00 ANNUAL PHYSICAL EXAM: Primary | ICD-10-CM

## 2024-03-07 DIAGNOSIS — E03.9 HYPOTHYROIDISM, UNSPECIFIED TYPE: ICD-10-CM

## 2024-03-07 DIAGNOSIS — F41.9 ANXIETY: ICD-10-CM

## 2024-03-07 PROCEDURE — 99396 PREV VISIT EST AGE 40-64: CPT | Performed by: FAMILY MEDICINE

## 2024-03-07 SDOH — ECONOMIC STABILITY: FOOD INSECURITY: WITHIN THE PAST 12 MONTHS, YOU WORRIED THAT YOUR FOOD WOULD RUN OUT BEFORE YOU GOT MONEY TO BUY MORE.: NEVER TRUE

## 2024-03-07 SDOH — ECONOMIC STABILITY: FOOD INSECURITY: WITHIN THE PAST 12 MONTHS, THE FOOD YOU BOUGHT JUST DIDN'T LAST AND YOU DIDN'T HAVE MONEY TO GET MORE.: NEVER TRUE

## 2024-03-07 SDOH — ECONOMIC STABILITY: INCOME INSECURITY: HOW HARD IS IT FOR YOU TO PAY FOR THE VERY BASICS LIKE FOOD, HOUSING, MEDICAL CARE, AND HEATING?: NOT HARD AT ALL

## 2024-03-23 RX ORDER — ZOLPIDEM TARTRATE 12.5 MG/1
12.5 TABLET, FILM COATED, EXTENDED RELEASE ORAL NIGHTLY PRN
Qty: 30 TABLET | Refills: 5 | Status: SHIPPED | OUTPATIENT
Start: 2024-03-23 | End: 2024-09-19

## 2024-03-23 RX ORDER — LORAZEPAM 0.5 MG/1
TABLET ORAL
Qty: 60 TABLET | Refills: 5 | Status: SHIPPED | OUTPATIENT
Start: 2024-03-23 | End: 2025-02-20

## 2024-03-23 ASSESSMENT — ENCOUNTER SYMPTOMS
EYES NEGATIVE: 1
RESPIRATORY NEGATIVE: 1
GASTROINTESTINAL NEGATIVE: 1

## 2024-03-23 NOTE — PROGRESS NOTES
HISTORY OF PRESENT ILLNESS  Calderon Martinez is a 58 y.o. y.o. male    Other  This is a new (physical) problem. The current episode started today. The problem has been gradually improving.   Weight Management  This is a recurrent problem. The current episode started more than 1 year ago. The problem has been gradually improving.   Thyroid Problem  Presents for follow-up visit. The symptoms have been stable.   Gout  This is a recurrent problem. The current episode started more than 1 year ago. The problem has been gradually improving.   Insomnia  This is a recurrent problem. The current episode started more than 1 year ago. The problem has been gradually improving.       Allergies   Allergen Reactions    Cefdinir Other (See Comments)     Joint pain        Current Outpatient Medications   Medication Sig    LORazepam (ATIVAN) 0.5 MG tablet 1-2 tabs at bedtime1-2 tabs at bedtime    zolpidem (AMBIEN CR) 12.5 MG extended release tablet Take 1 tablet by mouth nightly as needed for Sleep for up to 180 days. Max Daily Amount: 12.5 mg    levothyroxine (SYNTHROID) 100 MCG tablet TAKE 1 TABLET BY MOUTH DAILY BEFORE BREAKFAST    zolpidem (AMBIEN) 10 MG tablet Take 10 mg by mouth.    allopurinol (ZYLOPRIM) 100 MG tablet Take 1 tablet by mouth daily    levothyroxine (SYNTHROID) 100 MCG tablet TAKE 1 TABLET BY MOUTH DAILY BEFORE BREAKFAST    sildenafil (VIAGRA) 100 MG tablet Take 1/2 to 1 tablet by mouth once daily as needed    cetirizine (ZYRTEC) 10 MG tablet Take by mouth    Cholecalciferol 50 MCG (2000 UT) TABS Take by mouth    omeprazole (PRILOSEC OTC) 20 MG tablet Take 1 tablet by mouth daily    allopurinol (ZYLOPRIM) 100 MG tablet Take 1 tablet by mouth daily (Patient not taking: Reported on 3/7/2024)     No current facility-administered medications for this visit.        Past Medical History:   Diagnosis Date    Anxiety     Esophageal reflux     Gout     Insomnia     Unspecified hypothyroidism         Past Surgical

## 2024-06-24 RX ORDER — ALLOPURINOL 100 MG/1
100 TABLET ORAL DAILY
Qty: 90 TABLET | Refills: 3 | Status: SHIPPED | OUTPATIENT
Start: 2024-06-24

## 2024-07-12 RX ORDER — LEVOTHYROXINE SODIUM 0.1 MG/1
TABLET ORAL
Qty: 90 TABLET | Refills: 1 | Status: SHIPPED | OUTPATIENT
Start: 2024-07-12

## 2024-09-09 ENCOUNTER — OFFICE VISIT (OUTPATIENT)
Dept: FAMILY MEDICINE CLINIC | Facility: CLINIC | Age: 59
End: 2024-09-09
Payer: COMMERCIAL

## 2024-09-09 VITALS
HEART RATE: 78 BPM | TEMPERATURE: 98.5 F | WEIGHT: 181 LBS | SYSTOLIC BLOOD PRESSURE: 110 MMHG | OXYGEN SATURATION: 99 % | HEIGHT: 68 IN | BODY MASS INDEX: 27.43 KG/M2 | DIASTOLIC BLOOD PRESSURE: 68 MMHG

## 2024-09-09 DIAGNOSIS — E03.9 HYPOTHYROIDISM, UNSPECIFIED TYPE: ICD-10-CM

## 2024-09-09 DIAGNOSIS — F51.01 PRIMARY INSOMNIA: ICD-10-CM

## 2024-09-09 DIAGNOSIS — E78.5 ELEVATED LIPIDS: Primary | ICD-10-CM

## 2024-09-09 DIAGNOSIS — K21.9 GASTROESOPHAGEAL REFLUX DISEASE WITHOUT ESOPHAGITIS: ICD-10-CM

## 2024-09-09 LAB
CHOLEST SERPL-MCNC: 212 MG/DL (ref 0–200)
HDLC SERPL-MCNC: 49 MG/DL (ref 40–60)
HDLC SERPL: 4.4 (ref 0–5)
LDLC SERPL CALC-MCNC: 149 MG/DL (ref 0–100)
TRIGL SERPL-MCNC: 69 MG/DL (ref 0–150)
TSH, 3RD GENERATION: 1.65 UIU/ML (ref 0.27–4.2)
VLDLC SERPL CALC-MCNC: 14 MG/DL (ref 6–23)

## 2024-09-09 PROCEDURE — 99213 OFFICE O/P EST LOW 20 MIN: CPT | Performed by: FAMILY MEDICINE

## 2024-09-09 RX ORDER — ALLOPURINOL 100 MG/1
100 TABLET ORAL DAILY
Qty: 90 TABLET | Refills: 3 | Status: SHIPPED | OUTPATIENT
Start: 2024-09-09 | End: 2024-09-22

## 2024-09-09 RX ORDER — IBUPROFEN 200 MG
200 TABLET ORAL EVERY 6 HOURS PRN
COMMUNITY

## 2024-09-09 RX ORDER — MAGNESIUM GLUCONATE 27 MG(500)
500 TABLET ORAL 2 TIMES DAILY
COMMUNITY

## 2024-09-09 RX ORDER — LORAZEPAM 1 MG/1
TABLET ORAL
Qty: 60 TABLET | Refills: 5 | Status: SHIPPED | OUTPATIENT
Start: 2024-09-09 | End: 2025-03-09

## 2024-09-09 RX ORDER — LEVOTHYROXINE SODIUM 100 UG/1
TABLET ORAL
Qty: 30 TABLET | Refills: 5 | Status: SHIPPED | OUTPATIENT
Start: 2024-09-09